# Patient Record
Sex: FEMALE | URBAN - METROPOLITAN AREA
[De-identification: names, ages, dates, MRNs, and addresses within clinical notes are randomized per-mention and may not be internally consistent; named-entity substitution may affect disease eponyms.]

---

## 2024-01-02 ENCOUNTER — CLAIMS CREATED FROM THE CLAIM WINDOW (OUTPATIENT)
Dept: URBAN - METROPOLITAN AREA MEDICAL CENTER 10 | Facility: MEDICAL CENTER | Age: 34
End: 2024-01-02
Payer: COMMERCIAL

## 2024-01-02 DIAGNOSIS — R10.13 ABDOMINAL DISCOMFORT, EPIGASTRIC: ICD-10-CM

## 2024-01-02 DIAGNOSIS — R11.2 ACUTE NAUSEA WITH NONBILIOUS VOMITING: ICD-10-CM

## 2024-01-02 DIAGNOSIS — R19.7 ACUTE DIARRHEA: ICD-10-CM

## 2024-01-02 PROCEDURE — 99222 1ST HOSP IP/OBS MODERATE 55: CPT | Performed by: INTERNAL MEDICINE

## 2024-01-02 PROCEDURE — 99254 IP/OBS CNSLTJ NEW/EST MOD 60: CPT | Performed by: INTERNAL MEDICINE

## 2024-01-02 PROCEDURE — G8427 DOCREV CUR MEDS BY ELIG CLIN: HCPCS | Performed by: INTERNAL MEDICINE

## 2024-01-04 ENCOUNTER — CLAIMS CREATED FROM THE CLAIM WINDOW (OUTPATIENT)
Dept: URBAN - METROPOLITAN AREA MEDICAL CENTER 10 | Facility: MEDICAL CENTER | Age: 34
End: 2024-01-04
Payer: COMMERCIAL

## 2024-01-04 DIAGNOSIS — R19.7 ACUTE DIARRHEA: ICD-10-CM

## 2024-01-04 DIAGNOSIS — R10.13 ABDOMINAL DISCOMFORT, EPIGASTRIC: ICD-10-CM

## 2024-01-04 DIAGNOSIS — R11.2 ACUTE NAUSEA WITH NONBILIOUS VOMITING: ICD-10-CM

## 2024-01-04 PROCEDURE — 99232 SBSQ HOSP IP/OBS MODERATE 35: CPT | Performed by: INTERNAL MEDICINE

## 2024-01-05 ENCOUNTER — CLAIMS CREATED FROM THE CLAIM WINDOW (OUTPATIENT)
Dept: URBAN - METROPOLITAN AREA MEDICAL CENTER 10 | Facility: MEDICAL CENTER | Age: 34
End: 2024-01-05
Payer: COMMERCIAL

## 2024-01-05 DIAGNOSIS — R19.7 ACUTE DIARRHEA: ICD-10-CM

## 2024-01-05 DIAGNOSIS — R10.13 ABDOMINAL DISCOMFORT, EPIGASTRIC: ICD-10-CM

## 2024-01-05 DIAGNOSIS — R11.2 ACUTE NAUSEA WITH NONBILIOUS VOMITING: ICD-10-CM

## 2024-01-05 PROCEDURE — 99231 SBSQ HOSP IP/OBS SF/LOW 25: CPT | Performed by: INTERNAL MEDICINE

## 2024-01-10 ENCOUNTER — OFFICE VISIT (OUTPATIENT)
Dept: URBAN - METROPOLITAN AREA CLINIC 35 | Facility: CLINIC | Age: 34
End: 2024-01-10
Payer: COMMERCIAL

## 2024-01-10 ENCOUNTER — DASHBOARD ENCOUNTERS (OUTPATIENT)
Age: 34
End: 2024-01-10

## 2024-01-10 VITALS
SYSTOLIC BLOOD PRESSURE: 128 MMHG | WEIGHT: 180 LBS | DIASTOLIC BLOOD PRESSURE: 84 MMHG | BODY MASS INDEX: 31.89 KG/M2 | HEIGHT: 63 IN | OXYGEN SATURATION: 96 % | HEART RATE: 78 BPM

## 2024-01-10 DIAGNOSIS — R11.2 ACUTE NAUSEA WITH NONBILIOUS VOMITING: ICD-10-CM

## 2024-01-10 DIAGNOSIS — K59.09 CHANGE IN BOWEL MOVEMENTS INTERMITTENT CONSTIPATION. URGENCY IN THE MORNING.: ICD-10-CM

## 2024-01-10 DIAGNOSIS — E87.6 HYPOKALEMIA: ICD-10-CM

## 2024-01-10 PROBLEM — 14760008: Status: ACTIVE | Noted: 2024-01-10

## 2024-01-10 PROCEDURE — 99203 OFFICE O/P NEW LOW 30 MIN: CPT | Performed by: STUDENT IN AN ORGANIZED HEALTH CARE EDUCATION/TRAINING PROGRAM

## 2024-01-10 RX ORDER — TRAMADOL HYDROCHLORIDE 50 MG/1
1 TABLET AS NEEDED TABLET, FILM COATED ORAL ONCE A DAY
Status: ACTIVE | COMMUNITY

## 2024-01-10 RX ORDER — MAGNESIUM OXIDE 400 MG/1
1 TABLET AS NEEDED TABLET ORAL ONCE A DAY
Status: ACTIVE | COMMUNITY

## 2024-01-10 RX ORDER — ALBUTEROL SULFATE 90 UG/1
1 PUFF AS NEEDED AEROSOL, METERED RESPIRATORY (INHALATION)
Status: ACTIVE | COMMUNITY

## 2024-01-10 RX ORDER — SUCRALFATE 1 G/1
1 TABLET ON AN EMPTY STOMACH TABLET ORAL
Status: ACTIVE | COMMUNITY

## 2024-01-10 RX ORDER — DICYCLOMINE HYDROCHLORIDE 10 MG/1
1 CAPSULE 30 MINUTES BEFORE EATING CAPSULE ORAL
Status: ACTIVE | COMMUNITY

## 2024-01-10 RX ORDER — POTASSIUM CHLORIDE 1500 MG/1
1 TABLET WITH FOOD TABLET, EXTENDED RELEASE ORAL ONCE A DAY
Status: ACTIVE | COMMUNITY

## 2024-01-10 RX ORDER — PANTOPRAZOLE SODIUM 40 MG/1
1 TABLET TABLET, DELAYED RELEASE ORAL TWICE A DAY
Status: ACTIVE | COMMUNITY

## 2024-01-10 RX ORDER — SIMETHICONE 80 MG/1
1 TABLET AFTER MEALS AND AT BEDTIME AS NEEDED TABLET, CHEWABLE ORAL
Status: ACTIVE | COMMUNITY

## 2024-01-10 NOTE — HPI-TODAY'S VISIT:
33-year-old female history of asthma, hx H.pylori (pos breath test sp treatment),  with recent hospitalization at [facility] (dc 1/5/24) with acute epigastric pain, nausea vomiting, hypokalemia. CT was unremarkable. She was later hypoxic and imaging demonstrated pneumonia. She was treated with antibiotic. For abdominal pain, refractory nausea and vomiting she was evaluated EGD with findings as below. Overall her symptoms improved with conservative management with PPI antiemetics. Reports holding bland foods at present like oatmeal, soup. Nausea, no vomitting. Reports on bentyl intemrittently for cramping. Reports this is first episode of every having symptoms like this. Emesis continued from Dec 28- day after hospital Jan 6th with emesis. Rpeorts chronic constipation, reports 1-2x per week. Not taking anything. Reports hx intermittent heartburn even prior to hospital.. On pantoprazole 40 mg BID since hospital, , takes steve tea and helps with nausea, not taking zofran as caused palpitation. Reports prior to episode was at concern denies ingestion of any substance, did not eat there, denies friends getting sick.    H. Pylori positive in 03/22/2023  Lab from 12/31/2023 to 01/05/2024 (L); BUN 6(L); Bili, Tot 0.4;ALT 21; AST 13; Alk, Phos 47; K 2.9(L); Glucose 108(H) Creatinine 0.42(L); 0.41(L); 0.49(L), 0.53(L); Calculated Osmolality 267(abn) WBC 10.9(H); HGB 9.6(L); HCT 29.2(L); (H) Carbon Dioxide 22(abn); Ca 7.9(abn); Albumin 3.4(abn)  Lab 03/22/2023 H. Pylori Breath test: Positive  WBC 5.9; RBC 4.48; HGB 13.1; (H) Creatinine 0.64;Bili, Tot 0.4; Alk, Phos 64; AST 12; ALT 11 The Z-line was irregular and was found 35 cm from the incisors.     The cardia and gastric fundus were normal on retroflexion.     Excessive fluid and oropharyngeal secretions was found in the gastric      body.     Segmental moderate mucosal variance characterized by erythema,      granularity and altered texture was found in the entire examined      stomach. Biopsies were taken with a cold forceps for histology.      Verification of patient identification for the specimen was done.     The exam of the duodenum was otherwise normal.     Pyloric spasm noted , scope advanced with pressure EGD 01/03/2024-[facility] - Z-line irregular, 35 cm from the incisors. - Excessive gastric fluid. - Gastric mucosal variant. Biopsied. - Acquired deformity in the pylorus.  Pathology report: A. Stomach, biopsy: Oxyntic mucosa with chronic inactive gastritis. No Helicobacter-like organisms are identified (Giemsa stain). Negative for intestinal metaplasia (PAS/AB stain).  XR CHEST 1 VIEW PORTABLE 01/04/2024 Lung pneumonia versus atelectasis. Follow-up to resolution.   CT ANGIO CHEST PULMONARY EMBOLISM W IV CONTRAST 01/01/2024 1. No acute pulmonary embolism. 2. Severe left greater than right multilobar consolidation. Aspiration versus pneumonia.  XR CHEST 2 VIEWS PA + LAT STND PROTOCOL 01/01/2024 Mild hazy left midlung atelectasis or early infiltrate  CT ABDOMEN PELVIS W IV CONTRAST 12/31/2023 No acute abnormality in the abdomen and pelvis. Fibroid uterus.

## 2024-02-21 ENCOUNTER — OV EP (OUTPATIENT)
Dept: URBAN - METROPOLITAN AREA CLINIC 35 | Facility: CLINIC | Age: 34
End: 2024-02-21

## 2024-02-21 NOTE — HPI-TODAY'S VISIT:
33 year old female patient presents today for a follow-up visit  Lab?  Last visit 01/10/2024 33-year-old female history of asthma, hx H.pylori (pos breath test sp treatment),  with recent hospitalization at [facility] ([place] 1/5/24) with acute epigastric pain, nausea vomiting, hypokalemia. CT was unremarkable. She was later hypoxic and imaging demonstrated pneumonia. She was treated with antibiotic. For abdominal pain, refractory nausea and vomiting she was evaluated EGD with findings as below. Overall her symptoms improved with conservative management with PPI antiemetics. Reports holding bland foods at present like oatmeal, soup. Nausea, no vomitting. Reports on bentyl intemrittently for cramping. Reports this is first episode of every having symptoms like this. Emesis continued from Dec 28- day after hospital Jan 6th with emesis. Rpeorts chronic constipation, reports 1-2x per week. Not taking anything. Reports hx intermittent heartburn even prior to hospital.. On pantoprazole 40 mg BID since hospital, , takes steve tea and helps with nausea, not taking zofran as caused palpitation. Reports prior to episode was at concern denies ingestion of any substance, did not eat there, denies friends getting sick.    H. Pylori positive in 03/22/2023  Lab from 12/31/2023 to 01/05/2024 (L); BUN 6(L); Bili, Tot 0.4;ALT 21; AST 13; Alk, Phos 47; K 2.9(L); Glucose 108(H) Creatinine 0.42(L); 0.41(L); 0.49(L), 0.53(L); Calculated Osmolality 267(abn) WBC 10.9(H); HGB 9.6(L); HCT 29.2(L); (H) Carbon Dioxide 22(abn); Ca 7.9(abn); Albumin 3.4(abn)  Lab 03/22/2023 H. Pylori Breath test: Positive  WBC 5.9; RBC 4.48; HGB 13.1; (H) Creatinine 0.64;Bili, Tot 0.4; Alk, Phos 64; AST 12; ALT 11 The Z-line was irregular and was found 35 cm from the incisors.     The cardia and gastric fundus were normal on retroflexion.     Excessive fluid and oropharyngeal secretions was found in the gastric      body.     Segmental moderate mucosal variance characterized by erythema,      granularity and altered texture was found in the entire examined      stomach. Biopsies were taken with a cold forceps for histology.      Verification of patient identification for the specimen was done.     The exam of the duodenum was otherwise normal.     Pyloric spasm noted , scope advanced with pressure EGD 01/03/2024-[facility] - Z-line irregular, 35 cm from the incisors. - Excessive gastric fluid. - Gastric mucosal variant. Biopsied. - Acquired deformity in the pylorus.  Pathology report: A. Stomach, biopsy: Oxyntic mucosa with chronic inactive gastritis. No Helicobacter-like organisms are identified (Giemsa stain). Negative for intestinal metaplasia (PAS/AB stain).  XR CHEST 1 VIEW PORTABLE 01/04/2024 Lung pneumonia versus atelectasis. Follow-up to resolution.   CT ANGIO CHEST PULMONARY EMBOLISM W IV CONTRAST 01/01/2024 1. No acute pulmonary embolism. 2. Severe left greater than right multilobar consolidation. Aspiration versus pneumonia.  XR CHEST 2 VIEWS PA + LAT STND PROTOCOL 01/01/2024 Mild hazy left midlung atelectasis or early infiltrate  CT ABDOMEN PELVIS W IV CONTRAST 12/31/2023 No acute abnormality in the abdomen and pelvis. Fibroid uterus.